# Patient Record
Sex: FEMALE | Race: WHITE | NOT HISPANIC OR LATINO | URBAN - METROPOLITAN AREA
[De-identification: names, ages, dates, MRNs, and addresses within clinical notes are randomized per-mention and may not be internally consistent; named-entity substitution may affect disease eponyms.]

---

## 2022-07-22 ENCOUNTER — OFFICE VISIT (OUTPATIENT)
Dept: URBAN - METROPOLITAN AREA CLINIC 56 | Facility: CLINIC | Age: 37
End: 2022-07-22
Payer: COMMERCIAL

## 2022-07-22 DIAGNOSIS — G35 MULTIPLE SCLEROSIS: Primary | ICD-10-CM

## 2022-07-22 DIAGNOSIS — Z79.899 OTHER LONG TERM (CURRENT) DRUG THERAPY: ICD-10-CM

## 2022-07-22 PROCEDURE — 92134 CPTRZ OPH DX IMG PST SGM RTA: CPT | Performed by: STUDENT IN AN ORGANIZED HEALTH CARE EDUCATION/TRAINING PROGRAM

## 2022-07-22 PROCEDURE — 92083 EXTENDED VISUAL FIELD XM: CPT | Performed by: STUDENT IN AN ORGANIZED HEALTH CARE EDUCATION/TRAINING PROGRAM

## 2022-07-22 PROCEDURE — 92004 COMPRE OPH EXAM NEW PT 1/>: CPT | Performed by: STUDENT IN AN ORGANIZED HEALTH CARE EDUCATION/TRAINING PROGRAM

## 2022-07-22 ASSESSMENT — INTRAOCULAR PRESSURE
OD: 15
OS: 15

## 2022-07-22 ASSESSMENT — VISUAL ACUITY
OD: 20/20
OS: 20/20

## 2022-07-22 ASSESSMENT — KERATOMETRY
OD: 41.01
OS: 41.52

## 2022-07-22 NOTE — IMPRESSION/PLAN
Impression: Multiple sclerosis Plan: pt has started Tysarbi infusions 09/2021 monthly, currently about 6 weeks from last infusion d/t testing positive for COVID earlier this month. No  desat, no ONH findings, good vision and HVF 30-2 overall full. Discussed pain likely not optic neuritis, no other h/o optic neuritis, but if pain or vision significantly worsens over the weekend I remember pt return to [place] ER (pt neurologist is at [facility]). Pain could be sinus related. Continue decongestants, ATs and care with PCP. Call if symptoms worsen.